# Patient Record
Sex: MALE | Race: WHITE | NOT HISPANIC OR LATINO | Employment: FULL TIME | URBAN - METROPOLITAN AREA
[De-identification: names, ages, dates, MRNs, and addresses within clinical notes are randomized per-mention and may not be internally consistent; named-entity substitution may affect disease eponyms.]

---

## 2017-05-19 ENCOUNTER — OFFICE VISIT (OUTPATIENT)
Dept: MEDICAL GROUP | Facility: MEDICAL CENTER | Age: 47
End: 2017-05-19
Payer: COMMERCIAL

## 2017-05-19 VITALS
BODY MASS INDEX: 33.03 KG/M2 | HEIGHT: 74 IN | SYSTOLIC BLOOD PRESSURE: 128 MMHG | RESPIRATION RATE: 16 BRPM | TEMPERATURE: 97.7 F | DIASTOLIC BLOOD PRESSURE: 82 MMHG | OXYGEN SATURATION: 94 % | WEIGHT: 257.4 LBS | HEART RATE: 62 BPM

## 2017-05-19 DIAGNOSIS — E55.9 VITAMIN D INSUFFICIENCY: ICD-10-CM

## 2017-05-19 DIAGNOSIS — F17.200 TOBACCO DEPENDENCE: Chronic | ICD-10-CM

## 2017-05-19 DIAGNOSIS — Z79.891 CHRONIC USE OF OPIATE DRUGS THERAPEUTIC PURPOSES: ICD-10-CM

## 2017-05-19 DIAGNOSIS — F41.9 ANXIETY: Chronic | ICD-10-CM

## 2017-05-19 DIAGNOSIS — G47.00 INSOMNIA, UNSPECIFIED TYPE: ICD-10-CM

## 2017-05-19 DIAGNOSIS — Z00.00 ANNUAL PHYSICAL EXAM: ICD-10-CM

## 2017-05-19 DIAGNOSIS — G25.0 BENIGN FAMILIAL TREMOR: ICD-10-CM

## 2017-05-19 DIAGNOSIS — E66.9 OBESITY (BMI 30-39.9): ICD-10-CM

## 2017-05-19 DIAGNOSIS — M54.12 RADICULOPATHY, CERVICAL: ICD-10-CM

## 2017-05-19 DIAGNOSIS — E03.9 HYPOTHYROIDISM, UNSPECIFIED TYPE: Chronic | ICD-10-CM

## 2017-05-19 DIAGNOSIS — Z87.442 HISTORY OF NEPHROLITHIASIS: ICD-10-CM

## 2017-05-19 DIAGNOSIS — I10 ESSENTIAL HYPERTENSION: Chronic | ICD-10-CM

## 2017-05-19 DIAGNOSIS — M48.061 BILATERAL STENOSIS OF LATERAL RECESS OF LUMBAR SPINE: ICD-10-CM

## 2017-05-19 PROCEDURE — 99396 PREV VISIT EST AGE 40-64: CPT | Performed by: FAMILY MEDICINE

## 2017-05-19 RX ORDER — OXYCODONE AND ACETAMINOPHEN 7.5; 325 MG/1; MG/1
1 TABLET ORAL 2 TIMES DAILY PRN
Qty: 60 TAB | Refills: 0 | Status: SHIPPED | OUTPATIENT
Start: 2017-05-19 | End: 2018-01-04

## 2017-05-19 RX ORDER — LEVOTHYROXINE SODIUM 0.05 MG/1
50 TABLET ORAL
Qty: 90 TAB | Refills: 3 | Status: SHIPPED | OUTPATIENT
Start: 2017-05-19 | End: 2019-01-06 | Stop reason: SDUPTHER

## 2017-05-19 RX ORDER — METOPROLOL SUCCINATE 100 MG/1
100 TABLET, EXTENDED RELEASE ORAL EVERY EVENING
Qty: 90 TAB | Refills: 3 | Status: SHIPPED | OUTPATIENT
Start: 2017-05-19 | End: 2018-07-19 | Stop reason: SDUPTHER

## 2017-05-19 RX ORDER — VENLAFAXINE HYDROCHLORIDE 150 MG/1
150 CAPSULE, EXTENDED RELEASE ORAL EVERY EVENING
Qty: 90 CAP | Refills: 3 | Status: SHIPPED | OUTPATIENT
Start: 2017-05-19 | End: 2017-07-13 | Stop reason: SDUPTHER

## 2017-05-19 RX ORDER — METHOCARBAMOL 500 MG/1
500 TABLET, FILM COATED ORAL 3 TIMES DAILY PRN
Qty: 90 TAB | Refills: 2 | Status: SHIPPED | OUTPATIENT
Start: 2017-05-19 | End: 2018-01-04

## 2017-05-19 ASSESSMENT — PAIN SCALES - GENERAL: PAINLEVEL: 3=SLIGHT PAIN

## 2017-05-19 ASSESSMENT — PATIENT HEALTH QUESTIONNAIRE - PHQ9: CLINICAL INTERPRETATION OF PHQ2 SCORE: 0

## 2017-05-19 ASSESSMENT — ENCOUNTER SYMPTOMS: DEPRESSION: 1

## 2017-05-19 ASSESSMENT — LIFESTYLE VARIABLES: HISTORY_ALCOHOL_USE: 0

## 2017-05-19 NOTE — PROGRESS NOTES
Subjective:   Jose Traylor is a 46 y.o. male here today for annual    Bilateral stenosis of lateral recess of lumbar spine  Patient has history of lumbar spine stenosis bilaterally, he is required, lumbar spinal surgery.  Patient is complaining that recently his lumbar spine pain has returned. He is having right lower extremity radiculopathy with soreness and mild weakness.  Patient ran out of Percocet which she had left over from previous surgeries about 1.5 weeks ago. He has been using marijuana and ibuprofen to help with pain.  Patient is requesting a refill on Percocet for hopefully just short-term use until he can get his back operated on and pain treated.  Patient denies any history of narcotic addiction, actually narcotics make him feel sick to his stomach, so he does not like to take them. Patient denies any depression. 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    Consequences of Chronic Opiate therapy:    Analgesia:  improved  Activity:  improved  Adverse Events:  None  Aberrant Behaviors:  None  Affect/Mood: good grooming, full facial expressions, normal speech pattern and content, normal thought patterns, normal perception, good insight, normal reasoning  Last CMP:   6 months ago  Appropriate Imaging done:   Yes       Hypertension  Patient has been off the clonidine patch for the last week. He is still taking metoprolol, which he needs a refill for.    History of nephrolithiasis  Patient was treated for nephrolithiasis in October with the urologist.    Hypothyroidism  Patient is tolerating levothyroxine 50 µg daily. He has not had labs recently for thyroid function.    Insomnia  Patient states that he has difficulty sleeping because of pain.    Obesity (BMI 30-39.9)  Patient has lost 23 pounds since last year. He is limited on his function currently because of lower back pain.    Radiculopathy, cervical  Patient's cervical radiculopathy improved  "spontaneously but he is still having mild radiculopathy symptoms in hands.    Tobacco dependence  Patient's wife be starting Chantix to quit smoking. He states he does not need Chantix and can probably quit cold turkey.    Vitamin D insufficiency  Patient is taking vitamin D supplementation when he remembers.           Current medicines (including changes today)  Current Outpatient Prescriptions   Medication Sig Dispense Refill   • [START ON 5/22/2017] clonidine (CATAPRES) 0.1 MG/24HR PATCH WEEKLY Apply 1 Patch to skin as directed every Monday. 12 Patch 3   • levothyroxine (SYNTHROID) 50 MCG Tab Take 1 Tab by mouth Every morning on an empty stomach. 90 Tab 3   • metoprolol SR (TOPROL XL) 100 MG TABLET SR 24 HR Take 1 Tab by mouth every evening. 90 Tab 3   • venlafaxine (EFFEXOR XR) 150 MG extended-release capsule Take 1 Cap by mouth every evening. 90 Cap 3   • methocarbamol (ROBAXIN) 500 MG Tab Take 1 Tab by mouth 3 times a day as needed (muscle spasm). 90 Tab 2   • oxycodone-acetaminophen (PERCOCET) 7.5-325 MG per tablet Take 1 Tab by mouth 2 times a day as needed for Severe Pain. 60 Tab 0   • vitamin D (CHOLECALCIFEROL) 1000 UNIT Tab Take 1,000 Units by mouth every day.     • multivitamin (THERAGRAN) Tab Take 1 Tab by mouth every evening.       No current facility-administered medications for this visit.     He  has a past medical history of Hypertension; Hypothyroidism (3/10/2015); Benign familial tremor (3/10/2015); Anxiety (3/10/2015); Arthritis; Psychiatric problem; Arrhythmia; Pneumonia; Jaundice (1993); Unspecified cataract; Pain (9/2016); Tingling (9/2016); Renal disorder; and Infectious disease.    ROS   No chest pain, no shortness of breath, no abdominal pain       Objective:     Blood pressure 128/82, pulse 62, temperature 36.5 °C (97.7 °F), resp. rate 16, height 1.88 m (6' 2.02\"), weight 116.756 kg (257 lb 6.4 oz), SpO2 94 %. Body mass index is 33.03 kg/(m^2).   Physical Exam:  Constitutional: Alert, no " distress. Using cane to ambulate. Patient appears in discomfort from his lower back, changing positions frequently and eventually moving off the exam table onto a chair.  Skin: Warm, dry, good turgor, no rashes in visible areas.  Eye: Equal, round and reactive, conjunctiva clear, lids normal.  Respiratory: Unlabored respiratory effort, lungs clear to auscultation, no wheezes, no ronchi.  Cardiovascular: Normal S1, S2, no murmur, no edema.  Psych: Alert and oriented x3, normal affect and mood.        Assessment and Plan:   The following treatment plan was discussed    1. Annual physical exam  Check labs and call with results.  - CBC WITH DIFFERENTIAL; Future  - COMP METABOLIC PANEL; Future  - LIPID PROFILE; Future  - TSH WITH REFLEX TO FT4; Future  - VITAMIN D,25 HYDROXY; Future    2. Radiculopathy, cervical  Restart patient Percocet 7.5 mg up to twice daily. Controlled substance agreement signed today. Discussed the patient will need an appointment when he requires refills of narcotic pain medication. Discussed risk of narcotic pain medications.  UDS done today, we suspect that there will be no narcotics and she will be positive for marijuana. Advised patient to not use marijuana with narcotic pain medication.  - oxycodone-acetaminophen (PERCOCET) 7.5-325 MG per tablet; Take 1 Tab by mouth 2 times a day as needed for Severe Pain.  Dispense: 60 Tab; Refill: 0  - CONTROLLED SUBSTANCE TREATMENT AGREEMENT  - [email or web address] SCREEN; Future    3. Bilateral stenosis of lateral recess of lumbar spine  Refill Percocet.  - oxycodone-acetaminophen (PERCOCET) 7.5-325 MG per tablet; Take 1 Tab by mouth 2 times a day as needed for Severe Pain.  Dispense: 60 Tab; Refill: 0  - CONTROLLED SUBSTANCE TREATMENT AGREEMENT  - [email or web address] SCREEN; Future  - MR-LUMBAR SPINE-W/O; Future  - REFERRAL TO NEUROSURGERY    4. Essential hypertension  Controlled. Restart clonidine.    5. Hypothyroidism, unspecified  type  Continue levothyroxine. Check labs and call with results.    6. Vitamin D insufficiency  Continue vitamin D supplementation. Check labs and call with results.    7. History of nephrolithiasis  Resolved. Continue to monitor.    8. Obesity (BMI 30-39.9)  - Patient identified as having weight management issue.  Appropriate orders and counseling given.    9. Anxiety  Controlled with Effexor.    10. Insomnia, unspecified type  Hopefully controlling pain will improve sleep.    11. Tobacco dependence  Advised patient to quit. Offered Chantix but he declines.    12. Benign familial tremor  Controlled with Chantix. Restart Chantix.    13. Chronic use of opiate drugs therapeutic purposes      Followup: Return if symptoms worsen or fail to improve, for Pain medication refill, Short.

## 2017-05-19 NOTE — ASSESSMENT & PLAN NOTE
Patient's cervical radiculopathy improved spontaneously but he is still having mild radiculopathy symptoms in hands.

## 2017-05-19 NOTE — ASSESSMENT & PLAN NOTE
Patient has been off the clonidine patch for the last week. He is still taking metoprolol, which he needs a refill for.

## 2017-05-19 NOTE — ASSESSMENT & PLAN NOTE
Patient has history of lumbar spine stenosis bilaterally, he is required, lumbar spinal surgery.  Patient is complaining that recently his lumbar spine pain has returned. He is having right lower extremity radiculopathy with soreness and mild weakness.  Patient ran out of Percocet which she had left over from previous surgeries about 1.5 weeks ago. He has been using marijuana and ibuprofen to help with pain.  Patient is requesting a refill on Percocet for hopefully just short-term use until he can get his back operated on and pain treated.  Patient denies any history of narcotic addiction, actually narcotics make him feel sick to his stomach, so he does not like to take them. Patient denies any depression. 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    Consequences of Chronic Opiate therapy:    Analgesia:  improved  Activity:  improved  Adverse Events:  None  Aberrant Behaviors:  None  Affect/Mood: good grooming, full facial expressions, normal speech pattern and content, normal thought patterns, normal perception, good insight, normal reasoning  Last CMP:   6 months ago  Appropriate Imaging done:   Yes

## 2017-05-19 NOTE — ASSESSMENT & PLAN NOTE
Patient's wife be starting Chantix to quit smoking. He states he does not need Chantix and can probably quit cold turkey.

## 2017-05-19 NOTE — MR AVS SNAPSHOT
"        Jose Traylor   2017 8:00 AM   Office Visit   MRN: [id number]    Department:  [facility]   Dept Phone:  [phone]    Description:  Male : 1970   Provider:  Rudy Pham M.D.           Reason for Visit     Annual Exam           Allergies as of 2017     No Known Allergies      You were diagnosed with     Annual physical exam   [461566]       Radiculopathy, cervical   [938117]       Bilateral stenosis of lateral recess of lumbar spine   [0030676]       Essential hypertension   [1420182]       Hypothyroidism, unspecified type   [5999657]       Vitamin D insufficiency   [869991]       History of nephrolithiasis   [211719]       Obesity (BMI 30-39.9)   [999574]       Anxiety   [437308]       Insomnia, unspecified type   [1215852]       Tobacco dependence   [941892]       Benign familial tremor   [187061]       Chronic use of opiate drugs therapeutic purposes   [1959970]         Vital Signs     Blood Pressure Pulse Temperature Respirations Height Weight    128/82 mmHg 62 36.5 °C (97.7 °F) 16 1.88 m (6' 2.02\") 116.756 kg (257 lb 6.4 oz)    Body Mass Index Oxygen Saturation Smoking Status             33.03 kg/m2 94% Current Every Day Smoker         Basic Information     Date Of Birth Sex Race Ethnicity Preferred Language    1970 Male White Non- English      Problem List              ICD-10-CM Priority Class Noted - Resolved    Hypertension (Chronic) I10   3/10/2015 - Present    Hypothyroidism (Chronic) E03.9   3/10/2015 - Present    Benign familial tremor G25.0   3/10/2015 - Present    Anxiety (Chronic) F41.9   3/10/2015 - Present    Insomnia G47.00   3/10/2015 - Present    Vitamin D insufficiency E55.9   3/10/2015 - Present    Bilateral stenosis of lateral recess of lumbar spine M48.06   10/2/2015 - Present    Radiculopathy, cervical M54.12   10/4/2016 - Present    History of nephrolithiasis Z87.442   11/3/2016 - Present    Tobacco dependence (Chronic) F17.200   " 11/26/2016 - Present    Obesity (BMI 30-39.9) E66.9   5/19/2017 - Present    Chronic use of opiate drugs therapeutic purposes Z79.891   5/19/2017 - Present      Health Maintenance        Date Due Completion Dates    IMM DTaP/Tdap/Td Vaccine (1 - Tdap) 8/29/1987 ---    IMM PNEUMOCOCCAL 19-64 (ADULT) MEDIUM RISK SERIES (1 of 1 - PPSV23) 8/29/1987 ---            Current Immunizations     Influenza Vaccine Quad Inj (Pf) 11/26/2016 10:57 AM      Below and/or attached are the medications your provider expects you to take. Review all of your home medications and newly ordered medications with your provider and/or pharmacist. Follow medication instructions as directed by your provider and/or pharmacist. Please keep your medication list with you and share with your provider. Update the information when medications are discontinued, doses are changed, or new medications (including over-the-counter products) are added; and carry medication information at all times in the event of emergency situations     Allergies:  No Known Allergies          Medications  Valid as of: May 19, 2017 -  8:36 AM    Generic Name Brand Name Tablet Size Instructions for use    Cholecalciferol (Tab) cholecalciferol 1000 UNIT Take 1,000 Units by mouth every day.        CloNIDine HCl (PATCH WEEKLY) CATAPRES 0.1 MG/24HR Apply 1 Patch to skin as directed every Monday.        Levothyroxine Sodium (Tab) SYNTHROID 50 MCG Take 1 Tab by mouth Every morning on an empty stomach.        Methocarbamol (Tab) ROBAXIN 500 MG Take 1 Tab by mouth 3 times a day as needed (muscle spasm).        Metoprolol Succinate (TABLET SR 24 HR) TOPROL  MG Take 1 Tab by mouth every evening.        Multiple Vitamin (Tab) THERAGRAN  Take 1 Tab by mouth every evening.        Oxycodone-Acetaminophen (Tab) PERCOCET 7.5-325 MG Take 1 Tab by mouth 2 times a day as needed for Severe Pain.        Venlafaxine HCl (CAPSULE SR 24 HR) EFFEXOR- MG Take 1 Cap by mouth every evening.         .                 Medicines prescribed today were sent to:     [id number]  [place], [place] - [address] AT [facility] [address] [facility]    [address] [place] [address]    Phone: [phone] Fax: [phone]    Open 24 Hours?: No      Medication refill instructions:       If your prescription bottle indicates you have medication refills left, it is not necessary to call your provider’s office. Please contact your pharmacy and they will refill your medication.    If your prescription bottle indicates you do not have any refills left, you may request refills at any time through one of the following ways: The online [email or web address] system (except Urgent Care), by calling your provider’s office, or by asking your pharmacy to contact your provider’s office with a refill request. Medication refills are processed only during regular business hours and may not be available until the next business day. Your provider may request additional information or to have a follow-up visit with you prior to refilling your medication.   *Please Note: Medication refills are assigned a new Rx number when refilled electronically. Your pharmacy may indicate that no refills were authorized even though a new prescription for the same medication is available at the pharmacy. Please request the medicine by name with the pharmacy before contacting your provider for a refill.        Your To Do List     Future Labs/Procedures Complete By Expires    CBC WITH DIFFERENTIAL  As directed 5/20/2018    COMP METABOLIC PANEL  As directed 5/20/2018    LIPID PROFILE  As directed 5/20/2018    [facility] PAIN MANAGEMENT SCREEN  As directed 5/19/2018    Comments:    Current Meds (name, sig, last dose):   Current outpatient prescriptions:   •  vitamin D, 1,000 Units, Oral, DAILY  •  multivitamin, 1 Tab, Oral, Q EVENING  •  levothyroxine, 50 mcg, Oral, AM ES  •  metoprolol SR, 100 mg, Oral, Q EVENING  •  venlafaxine, 150 mg, Oral,  Q EVENING  •  clonidine, 1 Patch, Transdermal, Q MONDAY, 11/21/2016 at lt shoulder          MR-LUMBAR SPINE-W/O  As directed 11/19/2017    TSH WITH REFLEX TO FT4  As directed 5/19/2018    VITAMIN D,25 HYDROXY  As directed 5/20/2018      Referral     A referral request has been sent to our patient care coordination department. Please allow 3-5 business days for us to process this request and contact you either by phone or mail. If you do not hear from us by the 5th business day, please call us at [phone].        Instructions    Follow-up in clinic when needing refills on narcotic pain medication          MyChart Access Code: Activation code not generated  Current MyChart Status: Active          Quit Tobacco Information     Do you want to quit using tobacco?    Quitting tobacco decreases risks of cancer, heart and lung disease, increases life expectancy, improves sense of taste and smell, and increases spending money, among other benefits.    If you are thinking about quitting, we can help.  • [employer] Quit Tobacco Program: 833.849.1815  o Program occurs weekly for four weeks and includes pharmacist consultation on products to support quitting smoking or chewing tobacco. A provider referral is needed for pharmacist consultation.  • Tobacco Users Help Hotline: 1-183-QUIT-NOW (463-1395) or https://[place].quitlogix.org/  o Free, confidential telephone and online coaching for [place] residents. Sessions are designed on a schedule that is convenient for you. Eligible clients receive free nicotine replacement therapy.  • Nationally: www.smokefree.gov  o Information and professional assistance to support both immediate and long-term needs as you become, and remain, a non-smoker. Smokefree.gov allows you to choose the help that best fits your needs.

## 2017-05-19 NOTE — ASSESSMENT & PLAN NOTE
Patient has lost 23 pounds since last year. He is limited on his function currently because of lower back pain.

## 2017-05-19 NOTE — ASSESSMENT & PLAN NOTE
Patient is tolerating levothyroxine 50 µg daily. He has not had labs recently for thyroid function.

## 2017-07-13 RX ORDER — VENLAFAXINE HYDROCHLORIDE 150 MG/1
150 CAPSULE, EXTENDED RELEASE ORAL EVERY EVENING
Qty: 90 CAP | Refills: 3 | Status: SHIPPED | OUTPATIENT
Start: 2017-07-13 | End: 2018-07-07 | Stop reason: SDUPTHER

## 2017-07-13 NOTE — TELEPHONE ENCOUNTER
Was the patient seen in the last year in this department? Yes     Does patient have an active prescription for medications requested? No     Received Request Via: Patient     Last seen: 05/19/2017 Slots

## 2018-01-04 ENCOUNTER — APPOINTMENT (OUTPATIENT)
Dept: RADIOLOGY | Facility: MEDICAL CENTER | Age: 48
End: 2018-01-04
Attending: EMERGENCY MEDICINE
Payer: COMMERCIAL

## 2018-01-04 ENCOUNTER — HOSPITAL ENCOUNTER (EMERGENCY)
Facility: MEDICAL CENTER | Age: 48
End: 2018-01-04
Attending: EMERGENCY MEDICINE
Payer: COMMERCIAL

## 2018-01-04 VITALS
TEMPERATURE: 97.5 F | HEIGHT: 74 IN | HEART RATE: 61 BPM | DIASTOLIC BLOOD PRESSURE: 63 MMHG | OXYGEN SATURATION: 93 % | BODY MASS INDEX: 31.89 KG/M2 | RESPIRATION RATE: 19 BRPM | SYSTOLIC BLOOD PRESSURE: 127 MMHG | WEIGHT: 248.46 LBS

## 2018-01-04 DIAGNOSIS — R11.2 NAUSEA AND VOMITING, INTRACTABILITY OF VOMITING NOT SPECIFIED, UNSPECIFIED VOMITING TYPE: ICD-10-CM

## 2018-01-04 DIAGNOSIS — R50.9 FEVER, UNSPECIFIED FEVER CAUSE: ICD-10-CM

## 2018-01-04 DIAGNOSIS — I49.3 PVC'S (PREMATURE VENTRICULAR CONTRACTIONS): ICD-10-CM

## 2018-01-04 LAB
ALBUMIN SERPL BCP-MCNC: 4.9 G/DL (ref 3.2–4.9)
ALBUMIN/GLOB SERPL: 1.3 G/DL
ALP SERPL-CCNC: 49 U/L (ref 30–99)
ALT SERPL-CCNC: 19 U/L (ref 2–50)
ANION GAP SERPL CALC-SCNC: 11 MMOL/L (ref 0–11.9)
APTT PPP: 35.6 SEC (ref 24.7–36)
AST SERPL-CCNC: 20 U/L (ref 12–45)
BASOPHILS # BLD AUTO: 0.5 % (ref 0–1.8)
BASOPHILS # BLD: 0.06 K/UL (ref 0–0.12)
BILIRUB SERPL-MCNC: 0.8 MG/DL (ref 0.1–1.5)
BNP SERPL-MCNC: 29 PG/ML (ref 0–100)
BUN SERPL-MCNC: 16 MG/DL (ref 8–22)
CALCIUM SERPL-MCNC: 9.7 MG/DL (ref 8.4–10.2)
CHLORIDE SERPL-SCNC: 104 MMOL/L (ref 96–112)
CO2 SERPL-SCNC: 22 MMOL/L (ref 20–33)
CREAT SERPL-MCNC: 0.89 MG/DL (ref 0.5–1.4)
EKG IMPRESSION: NORMAL
EOSINOPHIL # BLD AUTO: 0.07 K/UL (ref 0–0.51)
EOSINOPHIL NFR BLD: 0.6 % (ref 0–6.9)
ERYTHROCYTE [DISTWIDTH] IN BLOOD BY AUTOMATED COUNT: 40.5 FL (ref 35.9–50)
FLUAV+FLUBV AG SPEC QL IA: NORMAL
GFR SERPL CREATININE-BSD FRML MDRD: >60 ML/MIN/1.73 M 2
GLOBULIN SER CALC-MCNC: 3.7 G/DL (ref 1.9–3.5)
GLUCOSE SERPL-MCNC: 96 MG/DL (ref 65–99)
HCT VFR BLD AUTO: 47.6 % (ref 42–52)
HGB BLD-MCNC: 16.7 G/DL (ref 14–18)
IMM GRANULOCYTES # BLD AUTO: 0.04 K/UL (ref 0–0.11)
IMM GRANULOCYTES NFR BLD AUTO: 0.3 % (ref 0–0.9)
INR PPP: 0.96 (ref 0.87–1.13)
LIPASE SERPL-CCNC: 27 U/L (ref 7–58)
LYMPHOCYTES # BLD AUTO: 2.33 K/UL (ref 1–4.8)
LYMPHOCYTES NFR BLD: 18.4 % (ref 22–41)
MCH RBC QN AUTO: 30.9 PG (ref 27–33)
MCHC RBC AUTO-ENTMCNC: 35.1 G/DL (ref 33.7–35.3)
MCV RBC AUTO: 88.1 FL (ref 81.4–97.8)
MONOCYTES # BLD AUTO: 0.64 K/UL (ref 0–0.85)
MONOCYTES NFR BLD AUTO: 5.1 % (ref 0–13.4)
NEUTROPHILS # BLD AUTO: 9.49 K/UL (ref 1.82–7.42)
NEUTROPHILS NFR BLD: 75.1 % (ref 44–72)
NRBC # BLD AUTO: 0 K/UL
NRBC BLD-RTO: 0 /100 WBC
PLATELET # BLD AUTO: 241 K/UL (ref 164–446)
PMV BLD AUTO: 9.6 FL (ref 9–12.9)
POTASSIUM SERPL-SCNC: 3.8 MMOL/L (ref 3.6–5.5)
PROT SERPL-MCNC: 8.6 G/DL (ref 6–8.2)
PROTHROMBIN TIME: 12.4 SEC (ref 12–14.6)
RBC # BLD AUTO: 5.4 M/UL (ref 4.7–6.1)
SIGNIFICANT IND 70042: NORMAL
SITE SITE: NORMAL
SODIUM SERPL-SCNC: 137 MMOL/L (ref 135–145)
SOURCE SOURCE: NORMAL
TROPONIN I SERPL-MCNC: <0.02 NG/ML (ref 0–0.04)
WBC # BLD AUTO: 12.6 K/UL (ref 4.8–10.8)

## 2018-01-04 PROCEDURE — 87400 INFLUENZA A/B EACH AG IA: CPT

## 2018-01-04 PROCEDURE — 36415 COLL VENOUS BLD VENIPUNCTURE: CPT

## 2018-01-04 PROCEDURE — 96374 THER/PROPH/DIAG INJ IV PUSH: CPT

## 2018-01-04 PROCEDURE — 80053 COMPREHEN METABOLIC PANEL: CPT

## 2018-01-04 PROCEDURE — 83880 ASSAY OF NATRIURETIC PEPTIDE: CPT

## 2018-01-04 PROCEDURE — 700111 HCHG RX REV CODE 636 W/ 250 OVERRIDE (IP): Performed by: EMERGENCY MEDICINE

## 2018-01-04 PROCEDURE — 99285 EMERGENCY DEPT VISIT HI MDM: CPT

## 2018-01-04 PROCEDURE — 85025 COMPLETE CBC W/AUTO DIFF WBC: CPT

## 2018-01-04 PROCEDURE — 93005 ELECTROCARDIOGRAM TRACING: CPT

## 2018-01-04 PROCEDURE — 83690 ASSAY OF LIPASE: CPT

## 2018-01-04 PROCEDURE — 85730 THROMBOPLASTIN TIME PARTIAL: CPT

## 2018-01-04 PROCEDURE — 85610 PROTHROMBIN TIME: CPT

## 2018-01-04 PROCEDURE — 700105 HCHG RX REV CODE 258: Performed by: EMERGENCY MEDICINE

## 2018-01-04 PROCEDURE — 71045 X-RAY EXAM CHEST 1 VIEW: CPT

## 2018-01-04 PROCEDURE — 93005 ELECTROCARDIOGRAM TRACING: CPT | Performed by: EMERGENCY MEDICINE

## 2018-01-04 PROCEDURE — 84484 ASSAY OF TROPONIN QUANT: CPT

## 2018-01-04 PROCEDURE — 81002 URINALYSIS NONAUTO W/O SCOPE: CPT

## 2018-01-04 PROCEDURE — 71045 X-RAY EXAM CHEST 1 VIEW: CPT | Performed by: EMERGENCY MEDICINE

## 2018-01-04 RX ORDER — ONDANSETRON 2 MG/ML
4 INJECTION INTRAMUSCULAR; INTRAVENOUS ONCE
Status: COMPLETED | OUTPATIENT
Start: 2018-01-04 | End: 2018-01-04

## 2018-01-04 RX ORDER — ACETAMINOPHEN 500 MG
1000 TABLET ORAL EVERY 6 HOURS PRN
COMMUNITY

## 2018-01-04 RX ORDER — SODIUM CHLORIDE 9 MG/ML
INJECTION, SOLUTION INTRAVENOUS CONTINUOUS
Status: DISCONTINUED | OUTPATIENT
Start: 2018-01-04 | End: 2018-01-04 | Stop reason: HOSPADM

## 2018-01-04 RX ORDER — ONDANSETRON 4 MG/1
4 TABLET, FILM COATED ORAL EVERY 8 HOURS PRN
Qty: 6 EACH | Refills: 2 | Status: SHIPPED | OUTPATIENT
Start: 2018-01-04

## 2018-01-04 RX ORDER — AMOXICILLIN 500 MG/1
2000 CAPSULE ORAL ONCE
COMMUNITY

## 2018-01-04 RX ORDER — HYDROCODONE BITARTRATE AND ACETAMINOPHEN 10; 325 MG/1; MG/1
1-2 TABLET ORAL EVERY 6 HOURS PRN
COMMUNITY

## 2018-01-04 RX ADMIN — SODIUM CHLORIDE 1000 ML: 9 INJECTION, SOLUTION INTRAVENOUS at 15:08

## 2018-01-04 RX ADMIN — ONDANSETRON 4 MG: 2 INJECTION INTRAMUSCULAR; INTRAVENOUS at 16:01

## 2018-01-04 ASSESSMENT — PAIN SCALES - GENERAL: PAINLEVEL_OUTOF10: 0

## 2018-01-04 NOTE — ED NOTES
"Chief Complaint   Patient presents with   • Nausea     yesterday   • Rapid Heart Beat     while at work this morning     /92   Pulse (!) 56   Temp 36.4 °C (97.5 °F)   Resp 16   Ht 1.88 m (6' 2\")   Wt 112.7 kg (248 lb 7.3 oz)   SpO2 95%   BMI 31.90 kg/m²     "

## 2018-01-04 NOTE — ED NOTES
"Med rec updated and complete  Allergies reviewed  Pt states \"I take my EFFEXOR ER 150MG at night, but I forgot to take it last night, so I took it this morning (1/4/2017)\".    "

## 2018-01-04 NOTE — ED NOTES
ERP at bedside. Pt agrees with plan of care discussed by ERP. AIDET acknowledged with patient. Raheem in low position, side rail up for pt safety. Call light within reach. Will continue to monitor.

## 2018-01-04 NOTE — ED PROVIDER NOTES
ED Provider Note    CHIEF COMPLAINT  Chief Complaint   Patient presents with   • Nausea     yesterday   • Rapid Heart Beat     while at work this morning       HPI  Jose Traylor is a 47 y.o. male who presentsWith renal nausea and vomiting 7 AM yesterday, last vomiting noon yesterday. Noted is temperature to be 100 last night, normal temperature before and after that. Coughing last night. None today. No chest pain. He states his normal heart rate is around 60 heart rate was 80 last night. Has a long history of frequent PVCs and the PVCs was more frequent today. No chest pain no shortness of breath.     REVIEW OF SYSTEMS  See HPI for further details. History of cardiac arrhythmia, jaundice pneumonia depression renal stones.1Denies other G.I., G.U.. endrocine, cardiovascular, respriatory or neurological problems.    All other systems are negative.     PAST MEDICAL HISTORY  Past Medical History:   Diagnosis Date   • Anxiety 3/10/2015   • Arrhythmia     PVC's   • Arthritis    • Benign familial tremor 3/10/2015   • Hypertension    • Hypothyroidism 3/10/2015   • Infectious disease     teacher   • Jaundice 1993    unknow reason   • Pain 9/2016    lower back, left hip, neck down right arm   • Pneumonia    • Psychiatric problem     depression/ anxiety   • Renal disorder     stones   • Tingling 9/2016    right arm/hand/fingers   • Unspecified cataract     bilateral IOL       FAMILY HISTORY  Family History   Problem Relation Age of Onset   • Heart Disease Mother      Stents and Pacemaker   • Hypertension Mother    • Heart Attack Father 62     MI   • Hypertension Father    • Hypertension Sister        SOCIAL HISTORY  Social History     Social History   • Marital status:      Spouse name: N/A   • Number of children: N/A   • Years of education: N/A     Social History Main Topics   • Smoking status: Current Every Day Smoker     Packs/day: 0.50     Years: 30.00     Types: Cigarettes     Start date: 9/30/1981   •  "Smokeless tobacco: Never Used      Comment: Quit 2015   • Alcohol use Yes   • Drug use:       Comment: Marijuana   • Sexual activity: Yes     Partners: Female     Other Topics Concern   • Not on file     Social History Narrative   • No narrative on file       SURGICAL HISTORY  Past Surgical History:   Procedure Laterality Date   • OTHER  8/2016    lithotripsy   • LUMBAR LAMINECTOMY DISKECTOMY  10/2/2015    Procedure: LUMBAR LAMINECTOMY DISKECTOMY redo posterior L3-S1 laminectomy, L4-5 discectomy;  Surgeon: Deep Stinson M.D.;  Location: SURGERY [facility];  Service:    • HIP REPLACEMENT, TOTAL  2013    Left   • CATARACT EXTRACTION WITH IOL  2013    Bilateral   • LUMBAR LAMINECTOMY DISKECTOMY  2012    L2-L3   • HIP ARTHROSCOPY  2011    Impingement   • MENISCUS REPAIR  2008    Left   • TONSILLECTOMY AND ADENOIDECTOMY  1972   • DENTAL EXTRACTION(S)         CURRENT MEDICATIONS  Home Medications    **Home medications have not yet been reviewed for this encounter**         ALLERGIES  No Known Allergies    PHYSICAL EXAM  VITAL SIGNS: /92   Pulse (!) 52   Temp 36.4 °C (97.5 °F)   Resp 17   Ht 1.88 m (6' 2\")   Wt 112.7 kg (248 lb 7.3 oz)   SpO2 96%   BMI 31.90 kg/m²    Constitutional: Well developed, Well nourished, No acute distress, Non-toxic appearance.   HENT: Normocephalic, Atraumatic, Bilateral external ears normal, Oropharynx moist, No oral exudates, Nose normal.   Eyes: PERRL, EOMI, Conjunctiva normal, No discharge.   Neck: Normal range of motion, No tenderness, Supple, No stridor.   Lymphatic: No lymphadenopathy noted.   Cardiovascular: Normal heart rate, Normal rhythm, No murmurs, No rubs, No gallops.   Thorax & Lungs: Normal breath sounds, No respiratory distress, No wheezing, No chest tenderness.   Abdomen:  No tenderness, no guarding no rigidity and the abdomen is soft.  No masses, No pulsatile masses.  Skin: Warm, Dry, No erythema, No rash.   Back: No tenderness, No CVA tenderness. "   Extremities: Intact distal pulses, No edema, No tenderness, No cyanosis, No clubbing.   Musculoskeletal: Good range of motion in all major joints. No tenderness to palpation or major deformities noted.   Neurologic: Alert & oriented x 3, Normal motor function, Normal sensory function, No focal deficits noted.   Psychiatric: Affect normal, Judgment normal, Mood normal.   EKG Interpretation    Interpreted by me    Rhythm: normal sinus   Rate: normal  Axis: normal  Ectopy: none  Conduction: normal  ST Segments: no acute change  T Waves: no acute change  Q Waves: none    Clinical Impression: I do not have an old EKG to compare to      RADIOLOGY/PROCEDURES  DX-CHEST-LIMITED (1 VIEW)   Final Result      No radiographic evidence of acute cardiopulmonary process.          COURSE & MEDICAL DECISION MAKING  Pertinent Labs & Imaging studies reviewed. (See chart for details) white count normal, elevated 12.6 hematocrit 4775 polys. Electrolytes normal renal function normal liver function normal. Troponin normal BMP is normal clotting studies normal    . He had a fever earlier, no fever here. However does have elevated white count. I'm not seeing evidence of any serious infection, suspect possible viral infection. Influenza, was negative however still does not rule out viral inability. I will treat him for his vomiting, Zofran, Tylenol for any fever. His PVCs have resolved.    FINAL IMPRESSION  1.   1. PVC's (premature ventricular contractions)    2. Fever, unspecified fever cause    3. Nausea and vomiting, intractability of vomiting not specified, unspecified vomiting type        2.   3.     Disposition  Discharge instructions are understood. This patient is to return if fever vomiting or no better in 12 hours. Follow up with the [facility] clinic or private physician. Information sheets on PVCs fever vomiting      Electronically signed by: Juanjose Barber, 1/4/2018 2:44 PM

## 2018-01-05 LAB
APPEARANCE UR: CLEAR
COLOR UR: YELLOW
GLUCOSE UR STRIP.AUTO-MCNC: NEGATIVE MG/DL
KETONES UR STRIP.AUTO-MCNC: 40 MG/DL
LEUKOCYTE ESTERASE UR QL STRIP.AUTO: NEGATIVE
NITRITE UR QL STRIP.AUTO: NEGATIVE
PH UR STRIP.AUTO: 5.5 [PH]
PROT UR QL STRIP: 30 MG/DL
RBC UR QL AUTO: NEGATIVE
SP GR UR STRIP.AUTO: 1.02

## 2018-01-05 NOTE — ED NOTES
Patient reports N/V, orders received for Zofran IV, Medicinal interventions carried out per ERP orders.

## 2018-01-05 NOTE — DISCHARGE INSTRUCTIONS
"Fever   Fever is a higher-than-normal body temperature. A normal temperature varies with:  · Age.   · How it is measured (mouth, underarm, rectal, or ear).   · Time of day.   In an adult, an oral temperature around 98.6° Fahrenheit (F) or 37° Celsius (C) is considered normal. A rise in temperature of about 1.8° F or 1° C is generally considered a fever (100.4° F or 38° C). In an infant age 28 days or less, a rectal temperature of 100.4° F (38° C) generally is regarded as fever. Fever is not a disease but can be a symptom of illness.  CAUSES   · Fever is most commonly caused by infection.   · Some non-infectious problems can cause fever. For example:   · Some arthritis problems.   · Problems with the thyroid or adrenal glands.   · Immune system problems.   · Some kinds of cancer.   · A reaction to certain medicines.   · Occasionally, the source of a fever cannot be determined. This is sometimes called a \"Fever of Unknown Origin\" (FUO).   · Some situations may lead to a temporary rise in body temperature that may go away on its own. Examples are:   · Childbirth.   · Surgery.   · Some situations may cause a rise in body temperature but these are not considered \"true fever\". Examples are:   · Intense exercise.   · Dehydration.   · Exposure to high outside or room temperatures.   SYMPTOMS   · Feeling warm or hot.   · Fatigue or feeling exhausted.   · Aching all over.   · Chills.   · Shivering.   · Sweats.   DIAGNOSIS   A fever can be suspected by your caregiver feeling that your skin is unusually warm. The fever is confirmed by taking a temperature with a thermometer. Temperatures can be taken different ways. Some methods are accurate and some are not:  With adults, adolescents, and children:   · An oral temperature is used most commonly.   · An ear thermometer will only be accurate if it is positioned as recommended by the .   · Under the arm temperatures are not accurate and not recommended.   · Most " electronic thermometers are fast and accurate.   Infants and Toddlers:  · Rectal temperatures are recommended and most accurate.   · Ear temperatures are not accurate in this age group and are not recommended.   · Skin thermometers are not accurate.   RISKS AND COMPLICATIONS   · During a fever, the body uses more oxygen, so a person with a fever may develop rapid breathing or shortness of breath. This can be dangerous especially in people with heart or lung disease.   · The sweats that occur following a fever can cause dehydration.   · High fever can cause seizures in infants and children.   · Older persons can develop confusion during a fever.   TREATMENT   · Medications may be used to control temperature.   · Do not give aspirin to children with fevers. There is an association with Reye's syndrome. Reye's syndrome is a rare but potentially deadly disease.   · If an infection is present and medications have been prescribed, take them as directed. Finish the full course of medications until they are gone.   · Sponging or bathing with room-temperature water may help reduce body temperature. Do not use ice water or alcohol sponge baths.   · Do not over-bundle children in blankets or heavy clothes.   · Drinking adequate fluids during an illness with fever is important to prevent dehydration.   HOME CARE INSTRUCTIONS   · For adults, rest and adequate fluid intake are important. Dress according to how you feel, but do not over-bundle.   · Drink enough water and/or fluids to keep your urine clear or pale yellow.   · For infants over 3 months and children, giving medication as directed by your caregiver to control fever can help with comfort. The amount to be given is based on the child's weight. Do NOT give more than is recommended.   SEEK MEDICAL CARE IF:   · You or your child are unable to keep fluids down.   · Vomiting or diarrhea develops.   · You develop a skin rash.   · An oral temperature above 102° F (38.9° C)  "develops, or a fever which persists for over 3 days.   · You develop excessive weakness, dizziness, fainting or extreme thirst.   · Fevers keep coming back after 3 days.   SEEK IMMEDIATE MEDICAL CARE IF:   · Shortness of breath or trouble breathing develops   · You pass out.   · You feel you are making little or no urine.   · New pain develops that was not there before (such as in the head, neck, chest, back, or abdomen).   · You cannot hold down fluids.   · Vomiting and diarrhea persist for more than a day or two.   · You develop a stiff neck and/or your eyes become sensitive to light.   · An unexplained temperature above 102° F (38.9° C) develops.   Document Released: 12/18/2006 Document Revised: 03/11/2013 Document Reviewed: 12/03/2009  [employer]® Patient Information ©2013 [employer].  Premature Beats  A premature beat is an extra heartbeat that happens earlier than normal. Premature beats are called premature atrial contractions (PACs) or premature ventricular contractions (PVCs) depending on the area of the heart where they start.  CAUSES   Premature beats may be brought on by a variety of factors including:  · Emotional stress.  · Lack of sleep.  · Caffeine.  · Asthma medicines.  · Stimulants.  · Herbal teas.  · Dietary supplements.  · Alcohol.  In most cases, premature beats are not dangerous and are not a sign of serious heart disease. Most patients evaluated for premature beats have completely normal heart function. Rarely, premature beats may be a sign of more significant heart problems or medical illness.  SYMPTOMS   Premature beats may cause palpitations. This means you feel like your heart is skipping a beat or beating harder than usual. Sometimes, slight chest pain occurs with premature beats, lasting only a few seconds. This pain has been described as a \"flopping\" feeling inside the chest. In many cases, premature beats do not cause any symptoms and they are only detected when an electrocardiography " test (EKG) or heart monitoring is performed.  DIAGNOSIS   Your caregiver may run some tests to evaluate your heart such as an EKG or echocardiography. You may need to wear a portable heart monitor for several days to record the electrical activity of your heart. Blood testing may also be performed to check your electrolytes and thyroid function.  TREATMENT   Premature beats usually go away with rest. If the problem continues, your caregiver will determine a treatment plan for you.   HOME CARE INSTRUCTIONS  · Get plenty of rest over the next few days until your symptoms improve.  · Avoid coffee, tea, alcohol, and soda (pop, cola).  · Do not smoke.  SEEK MEDICAL CARE IF:  · Your symptoms continue after 1 to 2 days of rest.  · You have new symptoms, such as chest pain or trouble breathing.  SEEK IMMEDIATE MEDICAL CARE IF:  · You have severe chest pain or abdominal pain.  · You have pain that radiates into the neck, arm, or jaw.  · You faint or have extreme weakness.  · You have shortness of breath.  · Your heartbeat races for more than 5 seconds.  MAKE SURE YOU:  · Understand these instructions.  · Will watch your condition.  · Will get help right away if you are not doing well or get worse.  Document Released: 01/25/2006 Document Revised: 03/11/2013 Document Reviewed: 08/20/2012  [employer]® Patient Information ©2014 [employer].      Nausea and Vomiting  Nausea means you feel sick to your stomach. Throwing up (vomiting) is a reflex where stomach contents come out of your mouth.  HOME CARE   · Take medicine as told by your doctor.  · Do not force yourself to eat. However, you do need to drink fluids.  · If you feel like eating, eat a normal diet as told by your doctor.  ¨ Eat rice, wheat, potatoes, bread, lean meats, yogurt, fruits, and vegetables.  ¨ Avoid high-fat foods.  · Drink enough fluids to keep your pee (urine) clear or pale yellow.  · Ask your doctor how to replace body fluid losses (rehydrate). Signs of  body fluid loss (dehydration) include:  ¨ Feeling very thirsty.  ¨ Dry lips and mouth.  ¨ Feeling dizzy.  ¨ Dark pee.  ¨ Peeing less than normal.  ¨ Feeling confused.  ¨ Fast breathing or heart rate.  GET HELP RIGHT AWAY IF:   · You have blood in your throw up.  · You have black or bloody poop (stool).  · You have a bad headache or stiff neck.  · You feel confused.  · You have bad belly (abdominal) pain.  · You have chest pain or trouble breathing.  · You do not pee at least once every 8 hours.  · You have cold, clammy skin.  · You keep throwing up after 24 to 48 hours.  · You have a fever.  MAKE SURE YOU:   · Understand these instructions.  · Will watch your condition.  · Will get help right away if you are not doing well or get worse.     This information is not intended to replace advice given to you by your health care provider. Make sure you discuss any questions you have with your health care provider.     Document Released: 06/05/2009 Document Revised: 03/11/2013 Document Reviewed: 05/18/2012  [employer] Interactive Patient Education ©2016 [employer]  Return if fever, vomiting or if no better in 12 hours.

## 2018-07-07 NOTE — LETTER
July 9, 2018        Jose Traylor  [address] [facility] [place] [address]        Dear Jose:    This letter is to inform you the you are due for an annual appointment. Please contact our scheduling department at [phone] To schedule       If you have any questions or concerns, please don't hesitate to call.        Sincerely,        Rudy Pham M.D.    Electronically Signed

## 2018-07-09 RX ORDER — VENLAFAXINE HYDROCHLORIDE 150 MG/1
CAPSULE, EXTENDED RELEASE ORAL
Qty: 90 CAP | Refills: 0 | Status: SHIPPED | OUTPATIENT
Start: 2018-07-09 | End: 2018-10-04 | Stop reason: SDUPTHER

## 2018-07-09 NOTE — TELEPHONE ENCOUNTER
Was the patient seen in the last year in this department? No  last visit 5/19/17    Does patient have an active prescription for medications requested? No     Received Request Via: Pharmacy

## 2018-07-09 NOTE — TELEPHONE ENCOUNTER
Refill done. Patient is due for annual appointment. Please have patient schedule.  Rudy Pham M.D.

## 2018-07-19 RX ORDER — METOPROLOL SUCCINATE 100 MG/1
TABLET, EXTENDED RELEASE ORAL
Qty: 90 TAB | Refills: 0 | Status: SHIPPED | OUTPATIENT
Start: 2018-07-19 | End: 2019-06-29 | Stop reason: SDUPTHER

## 2018-07-19 NOTE — LETTER
July 19, 2018        Jose Traylor  [address] [facility] [place] [address]        Dear Jose:    This letter is to inform you the you are due for an annual appointment. Please contact our scheduling department at [phone] To schedule       If you have any questions or concerns, please don't hesitate to call.        Sincerely,        Rudy Pham M.D.    Electronically Signed

## 2018-10-04 RX ORDER — VENLAFAXINE HYDROCHLORIDE 150 MG/1
CAPSULE, EXTENDED RELEASE ORAL
Qty: 90 CAP | Refills: 0 | Status: SHIPPED | OUTPATIENT
Start: 2018-10-04 | End: 2019-01-03 | Stop reason: SDUPTHER

## 2019-01-03 RX ORDER — VENLAFAXINE HYDROCHLORIDE 150 MG/1
CAPSULE, EXTENDED RELEASE ORAL
Qty: 90 CAP | Refills: 0 | Status: SHIPPED | OUTPATIENT
Start: 2019-01-03 | End: 2019-01-05 | Stop reason: SDUPTHER

## 2019-01-07 RX ORDER — VENLAFAXINE HYDROCHLORIDE 150 MG/1
CAPSULE, EXTENDED RELEASE ORAL
Qty: 90 CAP | Refills: 0 | Status: SHIPPED | OUTPATIENT
Start: 2019-01-07 | End: 2019-04-02 | Stop reason: SDUPTHER

## 2019-01-07 RX ORDER — LEVOTHYROXINE SODIUM 0.05 MG/1
TABLET ORAL
Qty: 90 TAB | Refills: 0 | Status: SHIPPED | OUTPATIENT
Start: 2019-01-07

## 2019-03-21 ENCOUNTER — OFFICE VISIT (OUTPATIENT)
Dept: MEDICAL GROUP | Facility: MEDICAL CENTER | Age: 49
End: 2019-03-21
Payer: OTHER MISCELLANEOUS

## 2019-03-21 VITALS
BODY MASS INDEX: 34.14 KG/M2 | HEART RATE: 52 BPM | TEMPERATURE: 97.7 F | OXYGEN SATURATION: 94 % | WEIGHT: 266 LBS | DIASTOLIC BLOOD PRESSURE: 68 MMHG | SYSTOLIC BLOOD PRESSURE: 120 MMHG | HEIGHT: 74 IN

## 2019-03-21 DIAGNOSIS — Z12.11 COLON CANCER SCREENING: ICD-10-CM

## 2019-03-21 DIAGNOSIS — Z23 NEED FOR VACCINATION: ICD-10-CM

## 2019-03-21 DIAGNOSIS — T75.3XXA SEA SICKNESS, INITIAL ENCOUNTER: ICD-10-CM

## 2019-03-21 PROCEDURE — 90715 TDAP VACCINE 7 YRS/> IM: CPT | Performed by: FAMILY MEDICINE

## 2019-03-21 PROCEDURE — 90732 PPSV23 VACC 2 YRS+ SUBQ/IM: CPT | Performed by: FAMILY MEDICINE

## 2019-03-21 PROCEDURE — 90471 IMMUNIZATION ADMIN: CPT | Performed by: FAMILY MEDICINE

## 2019-03-21 PROCEDURE — 99214 OFFICE O/P EST MOD 30 MIN: CPT | Mod: 25 | Performed by: FAMILY MEDICINE

## 2019-03-21 PROCEDURE — 90472 IMMUNIZATION ADMIN EACH ADD: CPT | Performed by: FAMILY MEDICINE

## 2019-03-21 RX ORDER — SCOLOPAMINE TRANSDERMAL SYSTEM 1 MG/1
1 PATCH, EXTENDED RELEASE TRANSDERMAL
Qty: 2 PATCH | Refills: 0 | Status: SHIPPED | OUTPATIENT
Start: 2019-03-21

## 2019-03-21 ASSESSMENT — PATIENT HEALTH QUESTIONNAIRE - PHQ9: CLINICAL INTERPRETATION OF PHQ2 SCORE: 0

## 2019-03-21 NOTE — PROGRESS NOTES
Subjective:   Jose Traylor is a 48 y.o. male here today for seasickness    Sea sickness  Patient has history of seasickness with significant nausea and vomiting that has been progressively getting worse over the years.  He has tried scopolamine patch in the past, which helped significantly.  He is taking a trip down to [facility] and [facility] soon and he is requesting a prescription for scopolamine patch.  His trip will be for 4 days.         Current medicines (including changes today)  Current Outpatient Prescriptions   Medication Sig Dispense Refill   • scopolamine (TRANSDERM-SCOP) 1 mg/72hr PATCH 72 HR Apply 1 Patch to skin as directed every 72 hours. 2 Patch 0   • venlafaxine (EFFEXOR-XR) 150 MG extended-release capsule TAKE 1 CAPSULE BY MOUTH EVERY EVENING 90 Cap 0   • levothyroxine (SYNTHROID) 50 MCG Tab TAKE 1 TABLET BY MOUTH EVERY MORNING ON AN EMPTY STOMACH 90 Tab 0   • cloNIDine (CATAPRES) 0.1 MG/24HR PATCH WEEKLY APPLY 1 PATCH EXTERNALLY TO THE SKIN EVERY 7 DAYS AS DIRECTED 12 Patch 0   • metoprolol SR (TOPROL XL) 100 MG TABLET SR 24 HR TAKE 1 TABLET BY MOUTH EVERY EVENING 90 Tab 0   • hydrocodone/acetaminophen (NORCO)  MG Tab Take 1-2 Tabs by mouth every 6 hours as needed for Severe Pain.     • acetaminophen (TYLENOL) 500 MG Tab Take 1,000 mg by mouth every 6 hours as needed for Moderate Pain.     • Doxylamine-DM (QC NIGHTTIME COUGH PO) Take 30 mL by mouth as needed (For cold symptoms).     • amoxicillin (AMOXIL) 500 MG Cap Take 2,000 mg by mouth Once. Pt went to the dentists on 12/28/2017     • ondansetron (ZOFRAN) 4 MG Tab tablet Take 1 Tab by mouth every 8 hours as needed (FOR NAUSEA OR VIMITING) for up to 6 doses. 6 Each 2   • vitamin D (CHOLECALCIFEROL) 1000 UNIT Tab Take 1,000 Units by mouth See Admin Instructions. Twice a week     • multivitamin (THERAGRAN) Tab Take 1 Tab by mouth See Admin Instructions. Twice a week       No current facility-administered medications for this visit.   "    He  has a past medical history of Anxiety (3/10/2015); Arrhythmia; Arthritis; Benign familial tremor (3/10/2015); Hypertension; Hypothyroidism (3/10/2015); Infectious disease; Jaundice (1993); Pain (9/2016); Pneumonia; Psychiatric problem; Renal disorder; Tingling (9/2016); and Unspecified cataract.    ROS   Occasional nonexertional left-sided chest pain, no shortness of breath       Objective:     Blood pressure 120/68, pulse (!) 52, temperature 36.5 °C (97.7 °F), height 1.88 m (6' 2\"), weight 120.7 kg (266 lb), SpO2 94 %. Body mass index is 34.15 kg/m².   Physical Exam:  Constitutional: Alert, no distress.  Skin: Warm, dry, good turgor, no rashes in visible areas.  Eye: Equal, round and reactive, conjunctiva clear, lids normal.  Psych: Alert and oriented x3, normal affect and mood.        Assessment and Plan:   The following treatment plan was discussed    1. Sea sickness, initial encounter  New problem.  Prescription for scopolamine patch.  - scopolamine (TRANSDERM-SCOP) 1 mg/72hr PATCH 72 HR; Apply 1 Patch to skin as directed every 72 hours.  Dispense: 2 Patch; Refill: 0    2. Need for vaccination  - Tdap Vaccine =>8YO IM  - Pneumococal Polysaccharide Vaccine 23-Valent =>1YO SQ/IM    3. Colon cancer screening  - REFERRAL TO GASTROENTEROLOGY      Followup: Return if symptoms worsen or fail to improve.         Patient is reducing tobacco use and is now down to 4-5 cigarettes/day and is weaning.  "

## 2019-03-21 NOTE — ASSESSMENT & PLAN NOTE
Patient has history of seasickness with significant nausea and vomiting that has been progressively getting worse over the years.  He has tried scopolamine patch in the past, which helped significantly.  He is taking a trip down to [facility] soon and he is requesting a prescription for scopolamine patch.  His trip will be for 4 days.

## 2019-04-02 RX ORDER — VENLAFAXINE HYDROCHLORIDE 150 MG/1
CAPSULE, EXTENDED RELEASE ORAL
Qty: 90 CAP | Refills: 3 | Status: SHIPPED | OUTPATIENT
Start: 2019-04-02 | End: 2020-03-30

## 2019-07-01 RX ORDER — METOPROLOL SUCCINATE 100 MG/1
TABLET, EXTENDED RELEASE ORAL
Qty: 90 TAB | Refills: 3 | Status: SHIPPED | OUTPATIENT
Start: 2019-07-01 | End: 2020-06-28

## 2020-06-28 RX ORDER — VENLAFAXINE HYDROCHLORIDE 150 MG/1
CAPSULE, EXTENDED RELEASE ORAL
Qty: 90 CAP | Refills: 0 | Status: SHIPPED | OUTPATIENT
Start: 2020-06-28 | End: 2020-09-28

## 2020-09-28 RX ORDER — VENLAFAXINE HYDROCHLORIDE 150 MG/1
CAPSULE, EXTENDED RELEASE ORAL
Qty: 90 CAP | Refills: 0 | Status: SHIPPED | OUTPATIENT
Start: 2020-09-28 | End: 2020-12-28

## 2020-09-28 RX ORDER — METOPROLOL SUCCINATE 100 MG/1
TABLET, EXTENDED RELEASE ORAL
Qty: 90 TAB | Refills: 0 | Status: SHIPPED | OUTPATIENT
Start: 2020-09-28 | End: 2021-02-23 | Stop reason: SDUPTHER

## 2020-12-29 RX ORDER — VENLAFAXINE HYDROCHLORIDE 150 MG/1
CAPSULE, EXTENDED RELEASE ORAL
Qty: 90 CAP | Refills: 0 | Status: SHIPPED | OUTPATIENT
Start: 2020-12-29

## 2021-02-23 RX ORDER — METOPROLOL SUCCINATE 100 MG/1
TABLET, EXTENDED RELEASE ORAL
Qty: 90 TABLET | Refills: 0 | Status: SHIPPED | OUTPATIENT
Start: 2021-02-23

## 2021-02-23 NOTE — TELEPHONE ENCOUNTER
Please advise patient to schedule with new PCP prior to further refill. Thanks! Natty Resendiz PA-C

## 2021-04-01 RX ORDER — VENLAFAXINE HYDROCHLORIDE 150 MG/1
150 CAPSULE, EXTENDED RELEASE ORAL DAILY
Qty: 90 CAPSULE | Refills: 0 | OUTPATIENT
Start: 2021-04-01

## 2021-06-01 RX ORDER — METOPROLOL SUCCINATE 100 MG/1
TABLET, EXTENDED RELEASE ORAL
Qty: 90 TABLET | Refills: 0 | Status: CANCELLED | OUTPATIENT
Start: 2021-06-01

## 2021-07-09 ENCOUNTER — HOSPITAL ENCOUNTER (EMERGENCY)
Dept: HOSPITAL 94 - ER | Age: 51
Discharge: HOME | End: 2021-07-09
Payer: COMMERCIAL

## 2021-07-09 VITALS — WEIGHT: 253.53 LBS | HEIGHT: 73 IN | BODY MASS INDEX: 33.6 KG/M2

## 2021-07-09 VITALS — DIASTOLIC BLOOD PRESSURE: 66 MMHG | SYSTOLIC BLOOD PRESSURE: 134 MMHG

## 2021-07-09 DIAGNOSIS — L55.9: ICD-10-CM
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
## 2021-07-09 DIAGNOSIS — I10: ICD-10-CM
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
## 2021-07-09 DIAGNOSIS — F12.90: ICD-10-CM
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
## 2021-07-09 DIAGNOSIS — Z72.89: ICD-10-CM

## 2021-07-09 DIAGNOSIS — F17.200: ICD-10-CM

## 2021-07-09 DIAGNOSIS — T67.5XXA: Primary | ICD-10-CM

## 2021-07-09 LAB
ALBUMIN SERPL BCP-MCNC: 3.7 G/DL (ref 3.4–5)
ALBUMIN/GLOB SERPL: 0.9 {RATIO} (ref 1.1–1.5)
ALP SERPL-CCNC: 50 IU/L (ref 46–116)
ALT SERPL W P-5'-P-CCNC: 19 U/L (ref 12–78)
ANION GAP SERPL CALCULATED.3IONS-SCNC: 10 MMOL/L (ref 8–16)
AST SERPL W P-5'-P-CCNC: 24 U/L (ref 10–37)
BASOPHILS # BLD AUTO: 0 X10'3 (ref 0–0.2)
BASOPHILS NFR BLD AUTO: 0.3 % (ref 0–1)
BILIRUB SERPL-MCNC: 1.6 MG/DL (ref 0.1–1)
BUN SERPL-MCNC: 14 MG/DL (ref 7–18)
BUN/CREAT SERPL: 14.1 (ref 5.4–32)
CALCIUM SERPL-MCNC: 8.2 MG/DL (ref 8.5–10.1)
CHLORIDE SERPL-SCNC: 104 MMOL/L (ref 99–107)
CO2 SERPL-SCNC: 26.5 MMOL/L (ref 24–32)
CREAT SERPL-MCNC: 0.99 MG/DL (ref 0.6–1.1)
EOSINOPHIL # BLD AUTO: 0 X10'3 (ref 0–0.9)
EOSINOPHIL NFR BLD AUTO: 0.1 % (ref 0–6)
ERYTHROCYTE [DISTWIDTH] IN BLOOD BY AUTOMATED COUNT: 13.3 % (ref 11.5–14.5)
GFR SERPL CREATININE-BSD FRML MDRD: 79 ML/MIN
GLUCOSE SERPL-MCNC: 103 MG/DL (ref 70–104)
HCT VFR BLD AUTO: 46.5 % (ref 42–52)
HGB BLD-MCNC: 15.8 G/DL (ref 14–17.9)
LYMPHOCYTES # BLD AUTO: 1.2 X10'3 (ref 1.1–4.8)
LYMPHOCYTES NFR BLD AUTO: 9.7 % (ref 21–51)
MCH RBC QN AUTO: 30.9 PG (ref 27–31)
MCHC RBC AUTO-ENTMCNC: 34 G/DL (ref 33–36.5)
MCV RBC AUTO: 90.9 FL (ref 78–98)
MONOCYTES # BLD AUTO: 1 X10'3 (ref 0–0.9)
MONOCYTES NFR BLD AUTO: 8.2 % (ref 2–12)
NEUTROPHILS # BLD AUTO: 10.1 X10'3 (ref 1.8–7.7)
NEUTROPHILS NFR BLD AUTO: 81.7 % (ref 42–75)
PLATELET # BLD AUTO: 212 X10'3 (ref 140–440)
PMV BLD AUTO: 7.4 FL (ref 7.4–10.4)
POTASSIUM SERPL-SCNC: 3.3 MMOL/L (ref 3.5–5.1)
PROT SERPL-MCNC: 7.7 G/DL (ref 6.4–8.2)
RBC # BLD AUTO: 5.12 X10'6 (ref 4.7–6.1)
SODIUM SERPL-SCNC: 140 MMOL/L (ref 135–145)
WBC # BLD AUTO: 12.4 X10'3 (ref 4.5–11)

## 2021-07-09 PROCEDURE — 84484 ASSAY OF TROPONIN QUANT: CPT

## 2021-07-09 PROCEDURE — 96361 HYDRATE IV INFUSION ADD-ON: CPT

## 2021-07-09 PROCEDURE — 93005 ELECTROCARDIOGRAM TRACING: CPT

## 2021-07-09 PROCEDURE — 99284 EMERGENCY DEPT VISIT MOD MDM: CPT

## 2021-07-09 PROCEDURE — 96375 TX/PRO/DX INJ NEW DRUG ADDON: CPT

## 2021-07-09 PROCEDURE — 85025 COMPLETE CBC W/AUTO DIFF WBC: CPT

## 2021-07-09 PROCEDURE — 96374 THER/PROPH/DIAG INJ IV PUSH: CPT

## 2021-07-09 PROCEDURE — 80053 COMPREHEN METABOLIC PANEL: CPT
